# Patient Record
Sex: MALE | Race: OTHER
[De-identification: names, ages, dates, MRNs, and addresses within clinical notes are randomized per-mention and may not be internally consistent; named-entity substitution may affect disease eponyms.]

---

## 2018-10-02 ENCOUNTER — HOSPITAL ENCOUNTER (EMERGENCY)
Dept: HOSPITAL 72 - EMR | Age: 28
Discharge: HOME | End: 2018-10-02
Payer: COMMERCIAL

## 2018-10-02 VITALS — DIASTOLIC BLOOD PRESSURE: 77 MMHG | SYSTOLIC BLOOD PRESSURE: 126 MMHG

## 2018-10-02 VITALS — SYSTOLIC BLOOD PRESSURE: 132 MMHG | DIASTOLIC BLOOD PRESSURE: 79 MMHG

## 2018-10-02 VITALS — DIASTOLIC BLOOD PRESSURE: 88 MMHG | SYSTOLIC BLOOD PRESSURE: 128 MMHG

## 2018-10-02 VITALS — BODY MASS INDEX: 24.44 KG/M2 | HEIGHT: 69 IN | WEIGHT: 165 LBS

## 2018-10-02 VITALS — DIASTOLIC BLOOD PRESSURE: 97 MMHG | SYSTOLIC BLOOD PRESSURE: 140 MMHG

## 2018-10-02 DIAGNOSIS — Z02.89: Primary | ICD-10-CM

## 2018-10-02 DIAGNOSIS — F19.10: ICD-10-CM

## 2018-10-02 DIAGNOSIS — R41.82: ICD-10-CM

## 2018-10-02 LAB
ADD MANUAL DIFF: NO
ALBUMIN SERPL-MCNC: 5.2 G/DL (ref 3.4–5)
ALBUMIN/GLOB SERPL: 1.3 {RATIO} (ref 1–2.7)
ALP SERPL-CCNC: 122 U/L (ref 46–116)
ALT SERPL-CCNC: 21 U/L (ref 12–78)
ANION GAP SERPL CALC-SCNC: 19 MMOL/L (ref 5–15)
APPEARANCE UR: (no result)
APTT PPP: (no result) S
AST SERPL-CCNC: 23 U/L (ref 15–37)
BASOPHILS NFR BLD AUTO: 1.6 % (ref 0–2)
BILIRUB SERPL-MCNC: 1 MG/DL (ref 0.2–1)
BUN SERPL-MCNC: 25 MG/DL (ref 7–18)
CALCIUM SERPL-MCNC: 10.6 MG/DL (ref 8.5–10.1)
CHLORIDE SERPL-SCNC: 97 MMOL/L (ref 98–107)
CK SERPL-CCNC: 644 U/L (ref 26–308)
CO2 SERPL-SCNC: 20 MMOL/L (ref 21–32)
CREAT SERPL-MCNC: 2 MG/DL (ref 0.55–1.3)
EOSINOPHIL NFR BLD AUTO: 0.3 % (ref 0–3)
ERYTHROCYTE [DISTWIDTH] IN BLOOD BY AUTOMATED COUNT: 10.3 % (ref 11.6–14.8)
GLOBULIN SER-MCNC: 4 G/DL
GLUCOSE UR STRIP-MCNC: NEGATIVE MG/DL
HCT VFR BLD CALC: 53.9 % (ref 42–52)
HGB BLD-MCNC: 19.5 G/DL (ref 14.2–18)
KETONES UR QL STRIP: (no result)
LEUKOCYTE ESTERASE UR QL STRIP: (no result)
LYMPHOCYTES NFR BLD AUTO: 18.8 % (ref 20–45)
MCV RBC AUTO: 88 FL (ref 80–99)
MONOCYTES NFR BLD AUTO: 9.7 % (ref 1–10)
NEUTROPHILS NFR BLD AUTO: 69.7 % (ref 45–75)
NITRITE UR QL STRIP: NEGATIVE
PH UR STRIP: 5 [PH] (ref 4.5–8)
PLATELET # BLD: 369 K/UL (ref 150–450)
POTASSIUM SERPL-SCNC: 3.6 MMOL/L (ref 3.5–5.1)
PROT UR QL STRIP: (no result)
RBC # BLD AUTO: 6.11 M/UL (ref 4.7–6.1)
SODIUM SERPL-SCNC: 136 MMOL/L (ref 136–145)
SP GR UR STRIP: 1.03 (ref 1–1.03)
UROBILINOGEN UR-MCNC: 1 MG/DL (ref 0–1)
WBC # BLD AUTO: 11.9 K/UL (ref 4.8–10.8)

## 2018-10-02 PROCEDURE — 96376 TX/PRO/DX INJ SAME DRUG ADON: CPT

## 2018-10-02 PROCEDURE — 93005 ELECTROCARDIOGRAM TRACING: CPT

## 2018-10-02 PROCEDURE — 99284 EMERGENCY DEPT VISIT MOD MDM: CPT

## 2018-10-02 PROCEDURE — 70450 CT HEAD/BRAIN W/O DYE: CPT

## 2018-10-02 PROCEDURE — 96375 TX/PRO/DX INJ NEW DRUG ADDON: CPT

## 2018-10-02 PROCEDURE — 70544 MR ANGIOGRAPHY HEAD W/O DYE: CPT

## 2018-10-02 PROCEDURE — 80307 DRUG TEST PRSMV CHEM ANLYZR: CPT

## 2018-10-02 PROCEDURE — 87086 URINE CULTURE/COLONY COUNT: CPT

## 2018-10-02 PROCEDURE — 84443 ASSAY THYROID STIM HORMONE: CPT

## 2018-10-02 PROCEDURE — 36415 COLL VENOUS BLD VENIPUNCTURE: CPT

## 2018-10-02 PROCEDURE — 82550 ASSAY OF CK (CPK): CPT

## 2018-10-02 PROCEDURE — 96365 THER/PROPH/DIAG IV INF INIT: CPT

## 2018-10-02 PROCEDURE — 80329 ANALGESICS NON-OPIOID 1 OR 2: CPT

## 2018-10-02 PROCEDURE — 81003 URINALYSIS AUTO W/O SCOPE: CPT

## 2018-10-02 PROCEDURE — 85025 COMPLETE CBC W/AUTO DIFF WBC: CPT

## 2018-10-02 PROCEDURE — 71045 X-RAY EXAM CHEST 1 VIEW: CPT

## 2018-10-02 PROCEDURE — 70551 MRI BRAIN STEM W/O DYE: CPT

## 2018-10-02 PROCEDURE — 84484 ASSAY OF TROPONIN QUANT: CPT

## 2018-10-02 PROCEDURE — 80053 COMPREHEN METABOLIC PANEL: CPT

## 2018-10-02 PROCEDURE — 96361 HYDRATE IV INFUSION ADD-ON: CPT

## 2018-10-02 NOTE — DIAGNOSTIC IMAGING REPORT
Indication: Altered mental status

 

Technique: sagittal T1 fast spin echo, axial T2 FLAIR, axial T2 FS PROPELLER, axial

diffusion weighted images. ADC map was generated. Patient was sedated for the exam,

but woke up before all sequences could be obtained. Patient became uncooperative, and

axial T1 and GRE sequences could not be obtained.

 

Comparison: none

 

Findings: Exam is limited due to lack of available sequences.  No abnormal areas of

restricted diffusion to suggest acute infarction. No acute hemorrhage or edema seen

on the T1 FLAIR images. No mass effect nor midline shift. Normal size ventricles and

extra axial CSF spaces. Visualized orbits and sinuses are unremarkable. 

 

The arterial flow voids are preserved. However, signal is seen within the left

transverse sinus, sigmoid sinus, and proximal internal jugular vein on multiple

sequences. In addition, signal is seen within the venous structures in the left

parotid gland on the T2-weighted images.

 

Impression: Limited exam, as described

 

Negative for acute infarct or edema. No definite evidence of hemorrhage on the T2

FLAIR images, but evaluation for such is somewhat limited in the absence of GRE

images

 

Signal within the left transverse sinus, sigmoid sinus, and proximal internal jugular

vein on multiple sequences. Suspect that this is an artifact of sluggish flow, but

thrombosis is not excludable. Consider MR venogram for better characterization

 

Findings discussed by phone with Dr. Martinez in the emergency room at the time of

interpretation

## 2018-10-02 NOTE — EMERGENCY ROOM REPORT
History of Present Illness


General


Chief Complaint:  Behavioral Complaint


Source:  Patient





Present Illness


HPI


Patient presents emergency department today with acute altered mental status.  

Patient apparently according to the police was knocking on doors and trying to 

get into different houses.  Police arrived and put the patient under police 

custody.  Patient however appeared to be confused and was brought here for 

further evaluation.  No further history is available as patient is patient is 

not communicative.  Patient appear to be talking to himself occasionally 

smiling.  Symptoms noted to be moderate to severe.  Unable to the obtained 

further history.  All history was obtained from paramedics and police 

officers.No other modifying factors.  No other associated signs and symptoms.  

No other complaints were noted.


Allergies:  


Coded Allergies:  


     UNABLE TO ASSESS (Unverified , 10/2/18)





Patient History


Past Medical History:  unable to obtain


Past Surgical History:  unable to obtain


Pertinent Family History:  unable to obtain


Social History Narrative


uncertain if using drugs


Reviewed Nursing Documentation:  PMH: Agreed; PSxH: Agreed





Nursing Documentation-PMH


Past Medical History:  No Stated History





Review of Systems


All Other Systems:  limited - Poor mental status





Physical Exam





Vital Signs








  Date Time  Temp Pulse Resp B/P (MAP) Pulse Ox O2 Delivery O2 Flow Rate FiO2


 


10/2/18 14:09 97.3 118 16 140/97 96 Room Air  





 97.3       








Sp02 EP Interpretation:  reviewed, normal


General Appearance:  alert, other - Appears confused


Head:  atraumatic


Eyes:  bilateral eye normal inspection


ENT:  dry mucus membranes


Neck:  supple


Respiratory:  lungs clear, no respiratory distress


Cardiovascular #1:  no edema, tachycardia


Gastrointestinal:  non tender, soft


Genitourinary:  no CVA tenderness


Musculoskeletal:  normal inspection


Neurologic:  alert, responsive, other - Appears confused but grossly nonfocal


Psychiatric:  other - Confused, poor judgment, poor insight


Skin:  normal inspection, normal color, no rash





Medical Decision Making


Diagnostic Impression:  


 Primary Impression:  


 Drug abuse


 Additional Impressions:  


 Altered mental status


 Medical clearance for incarceration


ER Course


Patient presents emergency department today status post police arrest and 

require medical clearance.  Patient apparently was confused required extensive 

workup including CT of the brain as well as MRI and then subsequently an RV and 

laboratory workup.  Laboratory workup was positive for cannabis and 

amphetamines.  MRI MRV CT were all negative.  Therefore patient was medically 

clear for incarceration.  He is advised to stop abusing drugs.  Patient is 

discharged in police custody.





Labs








Test


  10/2/18


15:00 10/2/18


15:20


 


White Blood Count


  11.9 K/UL


(4.8-10.8) 


 


 


Red Blood Count


  6.11 M/UL


(4.70-6.10) 


 


 


Hemoglobin


  19.5 G/DL


(14.2-18.0) 


 


 


Hematocrit


  53.9 %


(42.0-52.0) 


 


 


Mean Corpuscular Volume 88 FL (80-99)  


 


Mean Corpuscular Hemoglobin


  32.0 PG


(27.0-31.0) 


 


 


Mean Corpuscular Hemoglobin


Concent 36.2 G/DL


(32.0-36.0) 


 


 


Red Cell Distribution Width


  10.3 %


(11.6-14.8) 


 


 


Platelet Count


  369 K/UL


(150-450) 


 


 


Mean Platelet Volume


  7.0 FL


(6.5-10.1) 


 


 


Neutrophils (%) (Auto)


  69.7 %


(45.0-75.0) 


 


 


Lymphocytes (%) (Auto)


  18.8 %


(20.0-45.0) 


 


 


Monocytes (%) (Auto)


  9.7 %


(1.0-10.0) 


 


 


Eosinophils (%) (Auto)


  0.3 %


(0.0-3.0) 


 


 


Basophils (%) (Auto)


  1.6 %


(0.0-2.0) 


 


 


Urine Color Araceli  


 


Urine Appearance Cloudy  


 


Urine pH 5 (4.5-8.0)  


 


Urine Specific Gravity


  1.030


(1.005-1.035) 


 


 


Urine Protein 2+ (NEGATIVE)  


 


Urine Glucose (UA)


  Negative


(NEGATIVE) 


 


 


Urine Ketones 1+ (NEGATIVE)  


 


Urine Blood 1+ (NEGATIVE)  


 


Urine Nitrite


  Negative


(NEGATIVE) 


 


 


Urine Bilirubin 1+ (NEGATIVE)  


 


Urine Ictotest


  Negative


(NEGATIVE) 


 


 


Urine Urobilinogen


  1 MG/DL


(0.0-1.0) 


 


 


Urine Leukocyte Esterase 1+ (NEGATIVE)  


 


Urine RBC


  2-4 /HPF (0 -


0) 


 


 


Urine WBC


  5-10 /HPF (0 -


0) 


 


 


Urine Squamous Epithelial


Cells Occasional


/LPF 


 


 


Urine Amorphous Sediment


  Many /LPF


(NONE) 


 


 


Urine Bacteria


  Moderate /HPF


(NONE) 


 


 


Sodium Level


  136 MMOL/L


(136-145) 


 


 


Potassium Level


  3.6 MMOL/L


(3.5-5.1) 


 


 


Chloride Level


  97 MMOL/L


() 


 


 


Carbon Dioxide Level


  20 MMOL/L


(21-32) 


 


 


Anion Gap


  19 mmol/L


(5-15) 


 


 


Blood Urea Nitrogen


  25 mg/dL


(7-18) 


 


 


Creatinine


  2.0 MG/DL


(0.55-1.30) 


 


 


Estimat Glomerular Filtration


Rate 40.0 mL/min


(>60) 


 


 


Glucose Level


  120 MG/DL


() 


 


 


Calcium Level


  10.6 MG/DL


(8.5-10.1) 


 


 


Total Bilirubin


  1.0 MG/DL


(0.2-1.0) 


 


 


Aspartate Amino Transf


(AST/SGOT) 23 U/L (15-37) 


  


 


 


Alanine Aminotransferase


(ALT/SGPT) 21 U/L (12-78) 


  


 


 


Alkaline Phosphatase


  122 U/L


() 


 


 


Troponin I


  0.000 ng/mL


(0.000-0.056) 


 


 


Total Protein


  9.2 G/DL


(6.4-8.2) 


 


 


Albumin


  5.2 G/DL


(3.4-5.0) 


 


 


Globulin 4.0 g/dL  


 


Albumin/Globulin Ratio 1.3 (1.0-2.7)  


 


Thyroid Stimulating Hormone


(TSH) 1.998 uiU/mL


(0.358-3.740) 


 


 


Salicylates Level


  2.7 ug/mL


(2.8-20) 


 


 


Urine Opiates Screen


  Negative


(NEGATIVE) 


 


 


Acetaminophen Level


  < 2 MCG/ML


(10-30) 


 


 


Urine Barbiturates Screen


  Negative


(NEGATIVE) 


 


 


Phencyclidine (PCP) Screen


  Negative


(NEGATIVE) 


 


 


Urine Amphetamines Screen


  Positive


(NEGATIVE) 


 


 


Urine Benzodiazepines Screen


  Negative


(NEGATIVE) 


 


 


Urine Cocaine Screen


  Negative


(NEGATIVE) 


 


 


Urine Marijuana (THC) Screen


  Positive


(NEGATIVE) 


 


 


Serum Alcohol < 3 mg/dL  


 


Total Creatine Kinase


  


  644 U/L


()








EKG Diagnostic Results


Rate:  normal


Rhythm:  NSR


ST Segments:  no acute changes





Rhythm Strip Diag. Results


EP Interpretation:  yes


Rate:  113


Rhythm:  NSR, no PVC's, no ectopy





CT/MRI/US Diagnostic Results


CT/MRI/US Diagnostic Results #1:  


   Imaging Test Ordered:  MRI brain:  Possible slow blood flow in the internal 

jugular and transverse


CT/MRI/US Diagnostic Results #2:  


   Imaging Test Ordered:  MRV brain: No evidence of  clot.  Sinuses patent.





Last Vital Signs








  Date Time  Temp Pulse Resp B/P (MAP) Pulse Ox O2 Delivery O2 Flow Rate FiO2


 


10/2/18 14:09 97.3 118 16 140/97 96 Room Air  





 97.3       








Status:  improved


Disposition:  HOME, SELF-CARE


Condition:  Stable











Bryon Martinez MD Oct 2, 2018 15:20

## 2018-10-02 NOTE — DIAGNOSTIC IMAGING REPORT
Indication: Cough

 

Technique: One view of the chest

 

Comparison: none

 

Findings: Lungs and pleural spaces are clear. Heart size is normal

 

Impression: No acute process

## 2018-10-03 NOTE — DIAGNOSTIC IMAGING REPORT
Indications: Head pain, questionable dural sinus abnormality on recent MRI

 

Technique: 3D time-of-flight images obtained through the [place] [facility] [place]. MIP

reconstructions were generated in multiple rotational projections

 

Comparison: none

 

Findings: The left transverse and sigmoid sinus, upstream internal jugular vein are

patent, without evidence of intraluminal thrombus or other abnormality. The sagittal

sinuses and deep veins are also patent

 

Impression: Negative. Finding on previous MRI presumably artifactual

 

This agrees with the preliminary interpretation provided overnight by Dr. Ram

## 2018-10-03 NOTE — DIAGNOSTIC IMAGING REPORT
Indication: Reason For Exam: AMS

 

Technique: Continuous helical CT scanning of the head was performed without

intravenous contrast material. Axial and coronal 5 mm sections were generated.

Radiation dose was minimized using automated exposure control

 

Dose:

Total Dose Length Product - .51 mGycm.

Volume CT Dose Index - CTDIvol(s) 70.38,70.38 mGy.

 

Comparison: Brain MRI from 2 hours earlier

 

Findings: The ventricular system is normal in size and configuration. There is no

shift of midline structures. No abnormal extra-axial fluid collections are noted.

There is no evidence of intracerebral bleeding. No other abnormal high or low density

areas are noted within the brain.  Normal gray-white differentiation. Tiny osteoma

noted within a right ethmoid sinus. There is a scalp hematoma at the vertex

 

Impression: Negative for acute intracranial bleed or mass effect

 

Evidence of extracranial scalp soft tissue injury

 

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

 

This agrees with the preliminary interpretation provided overnight by Dr. Ram

 

The CT scanner at [facility] is accredited by the [employer]

[employer] and the scans are performed using protocols designed to limit radiation

exposure to as low as reasonably achievable to attain images of sufficient resolution

adequate for diagnostic evaluation.